# Patient Record
(demographics unavailable — no encounter records)

---

## 2024-11-20 NOTE — DISCUSSION/SUMMARY
[de-identified] : Assessment: Right/Left knee Osteoarthritis/Pain  Plan: 1. RICE Therapy. 2. Antiinflammatories/Tylenol as needed for pain of discomfort.  3. The patient was advised to rest the knee for 24-48 hours post injection.  The patient is able to resume normal activities in 24-48 hours post injection if the patient is experiencing minimal to no pain.  4. Continue with a home exercise/stretching program.  5. All the patients questions were answered.  If the patient is experiencing any problems or has concerns they were advised to call the office or make an appointment to come in to be evaluated.  Follow-up as needed.

## 2024-11-20 NOTE — PHYSICAL EXAM
[de-identified] : Right/left knee Physical Examination:  General: Alert and oriented x3.  In no acute distress.  Pleasant in nature with a normal affect.  No apparent respiratory distress.   Erythema, Warmth, Rubor: Negative Swelling/Edema: Negative ROM: 0-120 degrees, pain with hyperflexion.  Ayana's Test: Positive  Medial Joint Line TTP: Positive Lateral Joint Line TTP: Negative Lachman Exam/Anterior Drawer/Pivot Shift Test: Negative  MCL Pain: Negative LCL Pain: Negative Iliotibial Band Pain: Negative Patellofemoral Joint Pain: Negative Patellar Tendon TTP: Negative Pes Anserinus TTP: Negative Homans Sign: Negative Posterior Knee Pain: Negative Neuro: Intact with no sensory or motor deficits [de-identified] : No imaging performed.

## 2024-11-20 NOTE — PROCEDURE
[de-identified] : Laterality: Right knee  The risks and benefits of the injection were reviewed with the patient today in office and all their questions were answered.  The injection site was the anterolateral aspect of the knee with the patient sitting up, knees flexed to 90 degrees.  Prior to giving the injection the injection site was prepped with Chloraprep and a sterile field was created.  Sterile technique was carried out throughout the procedure.  After verbal consent from the patient we went ahead and injected the right knee today with Monovisc using a 22 willie 1.5" needle.  The medication was placed into the knee without complication.  Post injection the patient reported no pain, had a normal gait and good motion of the knee.  The patient denied numbness and tingling going down their leg.  There were no complications during the procedure.  Lot #[id number] Exp: 2026-12-02  Laterality: Left knee  The risks and benefits of the injection were reviewed with the patient today in office and all their questions were answered.  The injection site was the anterolateral aspect of the knee with the patient sitting up, knees flexed to 90 degrees.  Prior to giving the injection the injection site was prepped with Chloraprep and a sterile field was created.  Sterile technique was carried out throughout the procedure.  After verbal consent from the patient we went ahead and injected the left knee today with a steroid which consisted of 2ml of 40 mg Depo-Medrol using a 22 willie 1.5" needle.  After placing the steroid in the knee, it was chased with the Hyaluronic Acid medication (Monovisc) using the same 22 willie 1.5" needle.  The medication was placed into the knee without complication.  Post injection the patient reported no pain, had a normal gait and good motion of the knee.  The patient denied numbness and tingling going down their leg.  There were no complications during the procedure.  Lot #: [id number] Exp #: 2026-12-02

## 2024-11-20 NOTE — HISTORY OF PRESENT ILLNESS
[de-identified] : The patient is a 67-year-old active male with bilateral knee osteoarthritis seeking hyaluronic acid injections for bilateral knees.  He is also seeking a cortisone injection for his left knee for pain relief.  The patient remains active, continues to play ice hockey weekly.  No locking or catching of his knees.  The injections have helped in the past.  No other complaints.

## 2024-12-12 NOTE — PROCEDURE
[de-identified] : Laterality: Right knee  The risks and benefits of the injection were reviewed with the patient today in office and all their questions were answered.  The injection site was the anterolateral aspect of the knee with the patient sitting up, knees flexed to 90 degrees.  Prior to giving the injection the injection site was prepped with Chloraprep and a sterile field was created.  Sterile technique was carried out throughout the procedure.  After verbal consent from the patient we went ahead and injected the right knee today with 2 ml 40 mg Depo-Medrol, 2 ml 1% lidocaine without epinephrine for a total of 4 ml with a 25 willie 1.5" needle.  The medication was placed into the knee without complication.  Post injection the patient reported no pain, had a normal gait and good motion of the knee.  The patient denied numbness and tingling going down their leg.  There were no complications during the procedure.

## 2024-12-12 NOTE — PHYSICAL EXAM
[de-identified] : Right/left knee Physical Examination:  General: Alert and oriented x3.  In no acute distress.  Pleasant in nature with a normal affect.  No apparent respiratory distress.   Erythema, Warmth, Rubor: Negative Swelling/Edema: Negative ROM: 0-120 degrees, pain with hyperflexion.  Ayana's Test: Positive  Medial Joint Line TTP: Positive Lateral Joint Line TTP: Negative Lachman Exam/Anterior Drawer/Pivot Shift Test: Negative  MCL Pain: Negative LCL Pain: Negative Iliotibial Band Pain: Negative Patellofemoral Joint Pain: Negative Patellar Tendon TTP: Negative Pes Anserinus TTP: Negative Homans Sign: Negative Posterior Knee Pain: Negative Neuro: Intact with no sensory or motor deficits [de-identified] : No imaging performed.

## 2024-12-12 NOTE — HISTORY OF PRESENT ILLNESS
[de-identified] : The patient is a 67-year-old active male who presents in office seeking a right knee cortisone injection.  Patient was playing hockey tonight and felt a twinge in the knee.  Patient is very active and was also kneeling which increased his knee pain doing housework.  He denies locking, catching, giving out of his right knee.  No numbness or tingling going down his leg.  Pain scale 2 out of 10 in office today.  Pain increases with physical activity.  No other complaints.

## 2024-12-12 NOTE — DISCUSSION/SUMMARY
[de-identified] : Assessment: Right knee Osteoarthritis/Pain  Plan: 1. RICE Therapy. 2. Antiinflammatories/Tylenol as needed for pain of discomfort.  3. The patient was advised to rest the knee for 24-48 hours post injection.  The patient is able to resume normal activities in 24-48 hours post injection if the patient is experiencing minimal to no pain.  4. Continue with a home exercise/stretching program.  5. All the patients questions were answered.  If the patient is experiencing any problems or has concerns they were advised to call the office or make an appointment to come in to be evaluated.

## 2024-12-31 NOTE — ASSESSMENT
[FreeTextEntry1] : The patient is a 66 yo man presenting with left hip pain after increased activity and bowling. His pain is posterolateral to the left hip. He denies groin pain and lateral hip pain. He had bilateral hip resurfacing procedures with Dr. Dusty foley while back. The patient denies new trauma or falls. He denies paresthesias. The patient has increased pain with ambulation and stairs. He has used salon pas with good relief. His pain is deep to the glute. He has pain at night and with standing for too long.   Bilateral hip exam: The patient presents with no apparent distress. Neurovascularly intact. Sensation intact to bilateral lower extremities. Operative incision are well healed. No active drainage or discharge. + straight leg raise. 5/5 abductor strength. Tender to palpation to posterolateral hip through his glute and sciatic notch. No lateral hip pain at the greater trochanter. Patient is ambulating without pain.  Bilateral hip xrays taken today in office, 1 view AP pelvis and two views hip, WB: Bilateral hip resurfacing hardware intact without movement or loosening. No fractures noted. No obvious tumors, masses, or lesions  The patient was prescribed prednisone for pain relief. He likely has a sciatica flare up. He will return as needed.

## 2024-12-31 NOTE — HISTORY OF PRESENT ILLNESS
[FreeTextEntry5] : 66 y/o M presents for JR burgos today. Pt reports of difficulty sleeping on his Lt. side for the past 2 weeks. Elevated pain after bowling recently. Denies any direct trauma.  [FreeTextEntry9] : Salonpas [de-identified] : crossing leg over

## 2025-01-14 NOTE — DISCUSSION/SUMMARY
[de-identified] : 30 minutes was spent reviewing the x-rays as well as discussing with the patient their clinical presentation, diagnosis and providing education.  Conservative treatment was discussed with the patient at length. Anticipatory guidance regarding disease process right piriformis syndrome and gluteus tendinopathy bilateral scapulothoracic syndrome, avoidance of acute exacerbation this was discussed at length and all patients commenting concerns were answered to the patient's satisfaction. Physical therapy for decrease pain and increase function was ordered. Patient was given home exercises as approved by [employer] and works well held directed toward this particular process. Intermittent use of acetaminophen 500 mg 2 tablets t.i.d. p.r.n. mild to moderate pain, Medrol Dosepak for anti-inflammatory properties to be followed by meloxicam 15 mg once daily as needed for pain inflammation that is not controlled by Tylenol. Home exercise including stretching on a daily basis for 20-30 minutes was recommended. Heat, ice, topical were discussed as needed. The patient will followup in 6-8 weeks at which point in time if symptoms continue we will order lumbar and right hip MRI studies to guide treatment plan including possible injection therapy with pain management versus surgical option.

## 2025-01-14 NOTE — HISTORY OF PRESENT ILLNESS
[de-identified] : 67-year-old man who is a  by day and plays hockey and does bowling by night.  He is here for this focal right buttock pain which intermittently radiates down his lateral hip.  He also has some right greater than right scapulothoracic pain and tenderness worse with overhead lifting.  He feels that he is guarding when he skates regarding his right buttock and thigh.  Other than that he has no decrease in strength of the bilateral lower extremities no change in bowel bladder no radiating pain tingling and numbness down the legs.  No real back pain.  He has had piriformis syndrome in the past.  He has no recent physical therapy chiropractic pain management.  Pain onset after bowling twice and after playing hockey last night.  Pain has been intermittent for the last several weeks.  Past medical surgical social family allergy history is reviewed.

## 2025-01-14 NOTE — PHYSICAL EXAM
[de-identified] : CONSTITUTIONAL: The patient is a very pleasant individual who is well-nourished and who appears stated age. PSYCHIATRIC: The patient is alert and oriented X 3 and in no apparent distress, and participates with orthopedic evaluation well. HEAD: Atraumatic and is nonsyndromic in appearance. EENT: No visible thyromegaly, EOMI. RESPIRATORY: Respiratory rate is regular, not dyspneic on examination. LYMPHATICS: There is no inguinal lymphadenopathy INTEGUMENTARY: Skin is clean, dry, and intact about the bilateral lower extremities and lumbar spine. VASCULAR: There is brisk capillary refill about the bilateral lower extremities. NEUROLOGIC: There are no pathologic reflexes. There is no decrease in sensation of the bilateral lower extremities on manual examination. Deep tendon reflexes are well maintained at 2+/4 of the bilateral lower extremities and are symmetric.. MUSCULOSKELETAL: There is no visible muscular atrophy. Manual motor strength is well maintained in the bilateral lower extremities. Range of motion of lumbar spine is well maintained. The patient ambulates in a non-myelopathic manner. Negative tension sign and straight leg raise bilaterally. Quad extension, ankle dorsiflexion, EHL, plantar flexion, and ankle eversion are well preserved. Normal secondary orthopaedic exam of bilateral hips, greater trochanteric area, knees and ankles exam as highlighted by left gluteus tendinopathy piriformis syndrome type findings.  Bilateral tenderness to scapulothoracic borders right greater than left [de-identified] : AP lateral x-ray of lumbar spine done today in the office on the date of January 14, 2025 demonstrates age-appropriate spondylosis in the lower aspects of the lumbar spine without any acute process noted.  Bilateral total hip arthroplasty noted on x-ray as well

## 2025-03-04 NOTE — PHYSICAL EXAM
[Antalgic] : antalgic [de-identified] : CONSTITUTIONAL: The patient is a very pleasant individual who is well-nourished and who appears stated age. PSYCHIATRIC: The patient is alert and oriented X 3 and in no apparent distress, and participates with orthopedic evaluation well. HEAD: Atraumatic and is nonsyndromic in appearance. EENT: No visible thyromegaly, EOMI. RESPIRATORY: Respiratory rate is regular, not dyspneic on examination. LYMPHATICS: There is no inguinal lymphadenopathy INTEGUMENTARY: Skin is clean, dry, and intact about the bilateral lower extremities and lumbar spine. VASCULAR: There is brisk capillary refill about the bilateral lower extremities. NEUROLOGIC: There are no pathologic reflexes. There is no decrease in sensation of the bilateral lower extremities on manual  examination. Deep tendon reflexes are well maintained at 2+/4 of the bilateral lower extremities and are symmetric.. MUSCULOSKELETAL: There is no visible muscular atrophy. Manual motor strength is well maintained in the bilateral lower extremities, with the exception of 4/5 left hip flexor weakness. Range of motion of lumbar spine is well maintained, though there is pain with lateral bending. The patient ambulates in a non-myelopathic manner. Negative tension sign and straight leg raise bilaterally. Quad extension, ankle dorsiflexion, EHL, plantar flexion, and ankle eversion are well preserved. Normal secondary orthopaedic exam of bilateral hips, greater trochanteric area, knees and ankles. +TTP over the left lumbar paraspinal musculature and left gluteal region.  [de-identified] : X-rays of the lumbar spine visualized on today's date of February 28 shows diffuse lumbar spondylosis very gentle convexity to the right.  These are combined compared to x-rays from January 2025 in May 2022 with essentially no change more of a diffuse lumbar spondylosis type presentation.

## 2025-03-04 NOTE — PROCEDURE
[de-identified] : Verbal consent was obtained and all questions, comments and concerns were addressed.  After left superior gluteal/left lower paraspinal trigger point in the affected area into superficial muscular trigger point was cleansed with alcohol prep X 3, ethyl chloride was used as local anesthetic.  Under sterile precautions 1cc of 1 percent lidocaine without epinephrine, plus 10 mg depomedrol, were instilled into the affected trigger points.  Patient tolerated procedure well. Dry sterile dressing was placed and patient described relief of pain 5 minutes after procedure was performed.

## 2025-03-04 NOTE — PHYSICAL EXAM
[Antalgic] : antalgic [de-identified] : CONSTITUTIONAL: The patient is a very pleasant individual who is well-nourished and who appears stated age. PSYCHIATRIC: The patient is alert and oriented X 3 and in no apparent distress, and participates with orthopedic evaluation well. HEAD: Atraumatic and is nonsyndromic in appearance. EENT: No visible thyromegaly, EOMI. RESPIRATORY: Respiratory rate is regular, not dyspneic on examination. LYMPHATICS: There is no inguinal lymphadenopathy INTEGUMENTARY: Skin is clean, dry, and intact about the bilateral lower extremities and lumbar spine. VASCULAR: There is brisk capillary refill about the bilateral lower extremities. NEUROLOGIC: There are no pathologic reflexes. There is no decrease in sensation of the bilateral lower extremities on manual  examination. Deep tendon reflexes are well maintained at 2+/4 of the bilateral lower extremities and are symmetric.. MUSCULOSKELETAL: There is no visible muscular atrophy. Manual motor strength is well maintained in the bilateral lower extremities, with the exception of 4/5 left hip flexor weakness. Range of motion of lumbar spine is well maintained, though there is pain with lateral bending. The patient ambulates in a non-myelopathic manner. Negative tension sign and straight leg raise bilaterally. Quad extension, ankle dorsiflexion, EHL, plantar flexion, and ankle eversion are well preserved. Normal secondary orthopaedic exam of bilateral hips, greater trochanteric area, knees and ankles. +TTP over the left lumbar paraspinal musculature and left gluteal region.  [de-identified] : X-rays of the lumbar spine visualized on today's date of February 28 shows diffuse lumbar spondylosis very gentle convexity to the right.  These are combined compared to x-rays from January 2025 in May 2022 with essentially no change more of a diffuse lumbar spondylosis type presentation.

## 2025-03-04 NOTE — HISTORY OF PRESENT ILLNESS
[de-identified] : 67-year-old male presents today for follow-up evaluation of lower back pain and new onset left hip pain.  The patient was previously being treated for lumbar spondylosis, myositis as well as right gluteal tendinopathy/piriformis syndrome via physical therapy.  He states that after 3 weeks of consistent PT and home exercising he felt fantastic, symptoms essentially resolved and he was back to all of his activities.  However 3 weeks ago he was playing ice hockey and took a tumble onto the left-hand side.  He states he landed on the ice forcefully and slid towards the outer wall, hitting into it.  Since then he has been having primarily left-sided lumbar paraspinal pain and left supragluteal pain.  His pain is worse with prolonged standing and walking.  He has difficulty in standing up straight.  He also has difficulty lifting his left hip to put his shoes on or his close.  He denies any numbness tingling weakness or radiating pain down bilateral lower extremities.  No bowel / bladder incontinence. No saddle anesthesia.  Topical modalities such as heat, ice, lidocaine patches have been helpful in managing symptoms.  Meloxicam has also been helpful.

## 2025-03-04 NOTE — DISCUSSION/SUMMARY
[Medication Risks Reviewed] : Medication risks reviewed [de-identified] : 67 year old male presents with symptoms suggestive of lumbar spondylosis, myositis, and left gluteal tendinopathy s/p falls while playing ice hockey. 35 minutes was spent reviewing the x-rays as well as discussing with the patient their clinical presentation, diagnosis and providing education. Conservative treatment was discussed with the patient at length. Anticipatory guidance regarding disease process, avoidance of acute exacerbation this was discussed at length and all patients commenting concerns were answered to the patient's satisfaction.  At this time MRI of the lumbar spine as well as the left hip are ordered medically necessary to evaluate for underlying discogenic pathology that may be amenable to consultation with pain management versus tendinopathy.  Patient underwent a trigger point injection in the office today and tolerated well.  Patient will continue with activities as tolerated/PT as tolerated.  Advised patient to hold off on ice hockey until we obtain advanced imaging until pain resolves.  He will follow-up after MRIs to review results discuss any need for additional treatment modalities.  In the interim also continue with anti-inflammatories as needed. All questions and concerns were addressed with the patient and they are in agreement with this plan.   This note was generated using dragon medical dictation software. A reasonable effort has been made for proofreading its contents, but typos may still remain. If there are any questions or points of clarification needed please notify my office.

## 2025-03-04 NOTE — PROCEDURE
[de-identified] : Verbal consent was obtained and all questions, comments and concerns were addressed.  After left superior gluteal/left lower paraspinal trigger point in the affected area into superficial muscular trigger point was cleansed with alcohol prep X 3, ethyl chloride was used as local anesthetic.  Under sterile precautions 1cc of 1 percent lidocaine without epinephrine, plus 10 mg depomedrol, were instilled into the affected trigger points.  Patient tolerated procedure well. Dry sterile dressing was placed and patient described relief of pain 5 minutes after procedure was performed.

## 2025-03-04 NOTE — DISCUSSION/SUMMARY
[Medication Risks Reviewed] : Medication risks reviewed [de-identified] : 67 year old male presents with symptoms suggestive of lumbar spondylosis, myositis, and left gluteal tendinopathy s/p falls while playing ice hockey. 35 minutes was spent reviewing the x-rays as well as discussing with the patient their clinical presentation, diagnosis and providing education. Conservative treatment was discussed with the patient at length. Anticipatory guidance regarding disease process, avoidance of acute exacerbation this was discussed at length and all patients commenting concerns were answered to the patient's satisfaction.  At this time MRI of the lumbar spine as well as the left hip are ordered medically necessary to evaluate for underlying discogenic pathology that may be amenable to consultation with pain management versus tendinopathy.  Patient underwent a trigger point injection in the office today and tolerated well.  Patient will continue with activities as tolerated/PT as tolerated.  Advised patient to hold off on ice hockey until we obtain advanced imaging until pain resolves.  He will follow-up after MRIs to review results discuss any need for additional treatment modalities.  In the interim also continue with anti-inflammatories as needed. All questions and concerns were addressed with the patient and they are in agreement with this plan.   This note was generated using dragon medical dictation software. A reasonable effort has been made for proofreading its contents, but typos may still remain. If there are any questions or points of clarification needed please notify my office.

## 2025-03-04 NOTE — HISTORY OF PRESENT ILLNESS
[de-identified] : 67-year-old male presents today for follow-up evaluation of lower back pain and new onset left hip pain.  The patient was previously being treated for lumbar spondylosis, myositis as well as right gluteal tendinopathy/piriformis syndrome via physical therapy.  He states that after 3 weeks of consistent PT and home exercising he felt fantastic, symptoms essentially resolved and he was back to all of his activities.  However 3 weeks ago he was playing ice hockey and took a tumble onto the left-hand side.  He states he landed on the ice forcefully and slid towards the outer wall, hitting into it.  Since then he has been having primarily left-sided lumbar paraspinal pain and left supragluteal pain.  His pain is worse with prolonged standing and walking.  He has difficulty in standing up straight.  He also has difficulty lifting his left hip to put his shoes on or his close.  He denies any numbness tingling weakness or radiating pain down bilateral lower extremities.  No bowel / bladder incontinence. No saddle anesthesia.  Topical modalities such as heat, ice, lidocaine patches have been helpful in managing symptoms.  Meloxicam has also been helpful.

## 2025-03-07 NOTE — DISCUSSION/SUMMARY
[Medication Risks Reviewed] : Medication risks reviewed [de-identified] : 67 year old male presents with symptoms suggestive of lumbar spondylosis, left gluteal tendinopathy with an acute flareup status post falls. 35 minutes was spent reviewing the x-rays/MRI as well as discussing with the patient their clinical presentation, diagnosis and providing education. Conservative treatment was discussed with the patient at length. Anticipatory guidance regarding disease process, avoidance of acute exacerbation this was discussed at length and all patients commenting concerns were answered to the patient's satisfaction.  Patient states that he is already starting to improve after a period of rest from ice hockey as well as trigger point injection.  At this time recommending to continue with conservative modalities, light home stretching like lower body/gluteal strengthening exercises.  Advised to follow another 1 to 2 weeks prior to returning to ice hockey and focusing significantly on stretching and strengthening exercises as well as core exercises.  He can continue with medication as needed, topical modalities as needed.  When he feels comfortable he can return to activities as tolerated.  He can follow-up with us on an as-needed basis.  If symptoms are persistent we can also consider a repeat trigger point injection. All questions and concerns were addressed with the patient and they are in agreement with this plan.   This note was generated using dragon medical dictation software. A reasonable effort has been made for proofreading its contents, but typos may still remain. If there are any questions or points of clarification needed please notify my office.

## 2025-03-07 NOTE — PHYSICAL EXAM
[Antalgic] : antalgic [de-identified] : CONSTITUTIONAL: The patient is a very pleasant individual who is well-nourished and who appears stated age. PSYCHIATRIC: The patient is alert and oriented X 3 and in no apparent distress, and participates with orthopedic evaluation well. HEAD: Atraumatic and is nonsyndromic in appearance. EENT: No visible thyromegaly, EOMI. RESPIRATORY: Respiratory rate is regular, not dyspneic on examination. LYMPHATICS: There is no inguinal lymphadenopathy INTEGUMENTARY: Skin is clean, dry, and intact about the bilateral lower extremities and lumbar spine. VASCULAR: There is brisk capillary refill about the bilateral lower extremities. NEUROLOGIC: There are no pathologic reflexes. There is no decrease in sensation of the bilateral lower extremities on manual  examination. Deep tendon reflexes are well maintained at 2+/4 of the bilateral lower extremities and are symmetric.. MUSCULOSKELETAL: There is no visible muscular atrophy. Manual motor strength is well maintained in the bilateral lower extremities, with the exception of 4/5 left hip flexor weakness. Range of motion of lumbar spine is well maintained, though there is pain with lateral bending. The patient ambulates in a non-myelopathic manner. Negative tension sign and straight leg raise bilaterally. Quad extension, ankle dorsiflexion, EHL, plantar flexion, and ankle eversion are well preserved. Normal secondary orthopaedic exam of bilateral hips, greater trochanteric area, knees and ankles. +TTP over the left lumbar paraspinal musculature and left gluteal region, improved from previous visit. [de-identified] : X-rays of the lumbar spine visualized on today's date of February 28 shows diffuse lumbar spondylosis very gentle convexity to the right.  These are combined compared to x-rays from January 2025 in May 2022 with essentially no change more of a diffuse lumbar spondylosis type presentation.  MRI of the lumbar spine as well as the left hip obtained on 3/1/2025 at [facility] and reviewed in the office today demonstrated multilevel lumbar spondylosis without any areas of significant/severe stenosis noted.  Findings are mildly progressed from previous examinations.  He is status post left hip arthroplasty without any evidence of hardware loosening/failure.  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  No acute changes noted in the lumbar or left hip.

## 2025-03-07 NOTE — HISTORY OF PRESENT ILLNESS
[de-identified] : 67-year-old male presents today for follow-up evaluation of lower back pain and new onset left hip pain.  The patient was previously being treated for lumbar spondylosis, myositis as well as right gluteal tendinopathy/piriformis syndrome via physical therapy.  He states that after 3 weeks of consistent PT and home exercising he felt fantastic, symptoms essentially resolved and he was back to all of his activities.  However 3 weeks ago he was playing ice hockey and took a tumble onto the left-hand side.  He states he landed on the ice forcefully and slid towards the outer wall, hitting into it.  Since then he has been having primarily left-sided lumbar paraspinal pain and left supragluteal pain.  His pain is worse with prolonged standing and walking.  He has difficulty in standing up straight.  He also has difficulty lifting his left hip to put his shoes on or his close.  He denies any numbness tingling weakness or radiating pain down bilateral lower extremities.  No bowel / bladder incontinence. No saddle anesthesia.  Topical modalities such as heat, ice, lidocaine patches have been helpful in managing symptoms.  Meloxicam has also been helpful.  3/7/2025: Interval history-patient presents today for follow-up evaluation regarding back/gluteal pain status post a fall while ice-skating.  He is also here for MRI review.  States that after previous trigger point injection performed in the office approximately 1 week ago he has had great improvement in symptoms.  States that he is now able to walk and stand for prolonged periods of time without any significant pain.  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  Not having any radiculopathy.  No numbness tingling weakness.

## 2025-04-30 NOTE — PHYSICAL EXAM
[de-identified] : Right knee Physical Examination:  General: Alert and oriented x3.  In no acute distress.  Pleasant in nature with a normal affect.  No apparent respiratory distress.   Erythema, Warmth, Rubor: Negative Swelling/Edema: Positive swelling ROM: 0-130 degrees Ayana's Test: Negative  Medial Joint Line TTP: + Lateral Joint Line TTP: Negative  Lachman Exam/Anterior Drawer/Pivot Shift Test: Negative  MCL Pain: Negative LCL Pain: Negative Iliotibial Band Pain: Negative Patellofemoral Joint Pain: Negative Patellar Tendon TTP: Negative Pes Anserinus TTP: Negative Homans Sign: Negative Posterior Knee Pain: Negative Neuro: Intact with no sensory or motor deficits [de-identified] : Previous x-rays reviewed showed mild arthritic changes right knee.

## 2025-04-30 NOTE — HISTORY OF PRESENT ILLNESS
[de-identified] : The patient is a 67-year-old male who presents with acute on chronic right knee pain.  He was playing hockey in February and took a fall.  He did injure his back during the fall and states that since then, he does have pain in the right knee.  Most of his pain is behind the knee.  He states that he has increased pain with weightbearing and playing sports.  He is concerned with the increased pain to the right knee.  Pain scale at worst 5 out of 10.  No other complaints.

## 2025-04-30 NOTE — DISCUSSION/SUMMARY
[de-identified] : I do want to proceed with an MRI of the right knee without contrast to evaluate the posterior knee, evaluate joint OA/meniscal tearing.  Activity modifications until the MRI is completed.  Over-the-counter knee brace for sleeve.  Over-the-counter Tylenol for pain.  RICE therapy for swelling control.  Once the MRI is completed, the patient will return to office to review.  All of his questions were answered.  Understood and agreed to the treatment course.

## 2025-05-08 NOTE — DISCUSSION/SUMMARY
[de-identified] : Very pleasant gentleman tolerated a left thoracolumbar trigger point injection very well patient is kaylan be engaged in repeat physical therapy core strengthening soft tissue modalities foundation training home exercise program.  Continue being active ice-skating ice hockey is reasonable at this point in time signs and symptoms are still persistent patient can follow-up in approximately 6 to 8 weeks we will consider repeat trigger point injection.

## 2025-05-08 NOTE — PHYSICAL EXAM
[Antalgic] : antalgic [de-identified] : CONSTITUTIONAL: The patient is a very pleasant individual who is well-nourished and who appears stated age. PSYCHIATRIC: The patient is alert and oriented X 3 and in no apparent distress, and participates with orthopedic evaluation well. HEAD: Atraumatic and is nonsyndromic in appearance. EENT: No visible thyromegaly, EOMI. RESPIRATORY: Respiratory rate is regular, not dyspneic on examination. LYMPHATICS: There is no inguinal lymphadenopathy INTEGUMENTARY: Skin is clean, dry, and intact about the bilateral lower extremities and lumbar spine. VASCULAR: There is brisk capillary refill about the bilateral lower extremities. NEUROLOGIC: There are no pathologic reflexes. There is no decrease in sensation of the bilateral lower extremities on manual  examination. Deep tendon reflexes are well maintained at 2+/4 of the bilateral lower extremities and are symmetric.. MUSCULOSKELETAL: There is no visible muscular atrophy. Manual motor strength is well maintained in the bilateral lower extremities. Range of motion of lumbar spine is well maintained antalgic secondary to left thoracolumbar pain. The patient ambulates in a non-myelopathic manner. Negative tension sign and straight leg raise bilaterally. Quad extension, ankle dorsiflexion, EHL, plantar flexion, and ankle eversion are well preserved. Normal secondary orthopaedic exam of bilateral hips, greater trochanteric area, knees and ankles, very reproducible and rather large trigger point and myositis finding about the left thoracolumbar region.  Left buttock myalgia secondary to gluteus tearing tendinopathy completely resolved [de-identified] : Previous lumbar x-rays have been reviewed showing age-appropriate lumbar spondylosis no compression fractures.  Previous lumbar MRI is also been reviewed showing age-appropriate spondylosis mild possibly moderate spinal stenosis.

## 2025-05-08 NOTE — HISTORY OF PRESENT ILLNESS
[de-identified] : Very pleasant 67-year-old gentleman presents to follow-up for history of mechanically orientate of low back pain.  Since his hockey fall he is actually improved quite a bit.  He has noticed with increasing activity in the spring his left thoracolumbar myositis type presentation is persistent.  No distal radiculopathy status post bilateral hip replacements and complaints of right greater than left knee pain.  Otherwise doing relatively well from a spine perspective continues to play hockey be active excetra [Stable] : stable [___ mths] : [unfilled] month(s) ago [2] : a current pain level of 2/10 [0] : a minimum pain level of 0/10 [10] : a maximum pain level of 10/10 [Intermit.] : ~He/She~ states the symptoms seem to be intermittent [Bending] : worsened by bending [Lifting] : worsened by lifting [Exercise Regimen] : relieved by exercise regimen [Heat] : relieved by heat [Rest] : relieved by rest [Ataxia] : no ataxia [Incontinence] : no incontinence [Loss of Dexterity] : good dexterity [Urinary Ret.] : no urinary retention

## 2025-05-08 NOTE — PROCEDURE
[de-identified] : Verbal consent was obtained and all questions, comments and concerns were addressed.  After left thoracolumbar paraspinal trigger point in the affected area into superficial muscular trigger point was cleansed with alcohol prep X 3, ethyl chloride was used as local anesthetic.  Under sterile precautions 1cc of 1 percent lidocaine without epinephrine, plus 10 mg depomedrol, were instilled into the affected trigger points.  Patient tolerated procedure well. Dry sterile dressing was placed and patient described relief of pain 5 minutes after procedure was performed.

## 2025-05-28 NOTE — HISTORY OF PRESENT ILLNESS
[de-identified] : The patient is a 67-year-old male who presents with bilateral knee pain from OA.  The patient is concerned with his continued bilateral knee pain from his osteoarthritis.  He states that he tries to be active such as playing ice hockey but he has a lot of trouble playing because of his bilateral knee pain.  He is here seeking pain relief with possible gel injections and cortisone injections.  No other complaints.

## 2025-05-28 NOTE — PROCEDURE
[de-identified] : Laterality: Bilateral knee  The risks and benefits of the injection were reviewed with the patient today in office and all their questions were answered.  The injection site was the anterolateral aspect of the knee with the patient sitting up, knees flexed to 90 degrees.  Prior to giving the injection the injection site was prepped with Chloraprep and a sterile field was created.  Sterile technique was carried out throughout the procedure.  After verbal consent from the patient we went ahead and injected the bilateral knee today with a steroid which consisted of 1ml of 40 mg Depo-Medrol using a 22 willie 1.5" needle.  After placing the steroid in the knee, it was chased with the Hyaluronic Acid medication (Gel One) using the same 22 willie 1.5" needle.  The medication was placed into the knee without complication.  Post injection the patient reported no pain, had a normal gait and good motion of the knee.  The patient denied numbness and tingling going down their leg.  There were no complications during the procedure.  Gel One Lot #: [id number] Exp #: 2026-09-30  depo-medrol Lot#[id number] Exp: 2026/11

## 2025-05-28 NOTE — DISCUSSION/SUMMARY
[de-identified] : Assessment: Bilateral knee Osteoarthritis/Pain  Plan: 1. RICE Therapy. 2. Antiinflammatories/Tylenol as needed for pain of discomfort.  3. The patient was advised to rest the knee for 24-48 hours post injection.  The patient is able to resume normal activities in 24-48 hours post injection if the patient is experiencing minimal to no pain.  4. Continue with a home exercise/stretching program.  5. All the patients questions were answered.  If the patient is experiencing any problems or has concerns they were advised to call the office or make an appointment to come in to be evaluated.  Referral given to meet with Dr. Andrew Puentes.

## 2025-05-28 NOTE — PHYSICAL EXAM
[de-identified] : Right/left knee Physical Examination:  General: Alert and oriented x3.  In no acute distress.  Pleasant in nature with a normal affect.  No apparent respiratory distress.   Erythema, Warmth, Rubor: Negative Swelling/Edema: No swelling present today bilateral knees. ROM: 0-130 degrees Ayana's Test: Negative  Medial Joint Line TTP: + Lateral Joint Line TTP: Negative  Lachman Exam/Anterior Drawer/Pivot Shift Test: Negative  MCL Pain: Negative LCL Pain: Negative Iliotibial Band Pain: Negative Patellofemoral Joint Pain: Negative Patellar Tendon TTP: Negative Pes Anserinus TTP: Negative Homans Sign: Negative Posterior Knee Pain: Negative Neuro: Intact with no sensory or motor deficits [de-identified] : No new imaging performed.

## 2025-07-01 NOTE — ASSESSMENT
[FreeTextEntry1] : Patient presents for follow-up evaluation.  He has mild obstructive sleep apnea. Patient is also morbidly obese. Unfortunately does not qualify for Zepbound therapy since his AHI is 10 events per hour. He will continue on current APAP therapy. Follow-up in 6 months.

## 2025-07-01 NOTE — HISTORY OF PRESENT ILLNESS
[TextBox_4] : Mr. Hutchins presents for follow-up evaluation.  He has a history of mild obstructive sleep apnea.  Previous polysomnography examination showed an AHI of 10.  Patient is currently on APAP 5-15 cm H2O.  He recently had sinus surgery so has not been wearing his CPAP for the past 3 to 4 weeks but has recently restarted.

## 2025-07-10 NOTE — PHYSICAL EXAM
[de-identified] : Right lower extremity Hip: Normal ROM without pain on IR/ER Knee Inspection: Mild effusion Wounds: None Alignment: Mild valgus Palpation: tender to palpation at medial joint line ROM active (in degrees): 0-130 with crepitus/pain through the arc of motion Ligamentous laxity: All ligaments appear stable, stable to varus stress test, stable to valgus stress test Meniscal Test: Negative McMurrays, negative Sindhu. Muscle Test: good quad strength. [de-identified] : 7/10/25: Right knee xrays, standing AP/Lateral and Merchant films, and 45 degree PA standing view are reviewed and demonstrate diffuse tricompartmental degenerative arthritis, lateral joint space narrowing, marginal osteophytes, bone on bone with sclerosis, patellofemoral joint space narrowing

## 2025-07-10 NOTE — HISTORY OF PRESENT ILLNESS
[de-identified] : 7/10/25: Patient presents with right knee pain for years.  Patient has received multiple injections in the past, last was Gel-One on 5/20/2025 by Dr. Soler which only provided a few weeks of relief.  His left knee is doing fine at this point.  Patient plays pickle ball and ice hockey and feels he is unable to play ice hockey due to the knee pain.  Using Voltaren gel.  Has done physical therapy which provided some relief.  Is considering a trip in November but debating on where exactly based on how his knee is going to be.  Also states he may start Zepbound for weight loss.  Relevant PMH: Sleep apnea, hypertension, hyperlipidemia Relevant allergies: Penicillin AC: 81 aspirin Hx of DVT/PE: Denies Nicotine: Denies

## 2025-07-10 NOTE — DISCUSSION/SUMMARY
[de-identified] : Severe right knee osteoarthritis, valgus  Extensive discussion of the natural history of this disease was had with the patient.  We discussed the treatment options ranging from conservative therapy which includes anti-inflammatories, steroid/HA injections, physical therapy, weight loss, knee sleeves/braces, and activity modification.  We did discuss that the ultimate treatment for arthritis is a joint replacement.   -A prescription for meloxicam with the appropriate warnings for GI, cardiac, and renal side effects was given to the patient.  Patient was instructed not to use any other NSAIDs with this medication. He is unsure if he wants to move forward with surgery or try steroid injection.  Gel injections did not provide prolonged relief. Patient will follow-up as needed.  This patient is being managed for a complex chronic issue that requires ongoing medical management. The nature of this condition requires a longitudinal relationship and monitoring over time for appropriate treatment. The patient's current medication management of their orthopedic diagnosis was reviewed today: (1) We discussed a comprehensive treatment plan that included possible pharmaceutical management involving the use of prescription strength medications including but not limited to options such as oral Ibuprofen 400mg QID, once daily Meloxicam 15 mg, or 500-650 mg Tylenol versus over the counter oral medications and topical prescription NSAID Pennsaid vs over the counter Voltaren gel. (2) There is a moderate risk of morbidity with further treatment, especially from use of prescription strength medications and possible side effects of these medications which include upset stomach with oral medications, skin reactions to topical medications and cardiac/renal issues with long term use. (3) I recommended that the patient follow-up with their medical physician to discuss any significant specific potential issues with long term medication use such as interactions with current medications or with exacerbation of underlying medical comorbidities. (4) The benefits and risks associated with use of injectable, oral or topical, prescription and over the counter anti-inflammatory medications were discussed with the patient. The patient voiced understanding of the risks including but not limited to bleeding, stroke, kidney dysfunction, heart disease, and were referred to the black box warning label for further information.

## 2025-07-17 NOTE — HISTORY OF PRESENT ILLNESS
[de-identified] : The patient is a 68 year  old RT hand dominant male who presents today complaining of RT Knee pain.   Date of Injury/Onset: 2017 Pain:    At Rest: 0/10  With Activity:  7/10  Mechanism of injury: while playing ice hockey injured his right knee This is NOT a Work Related Injury being treated under Worker's Compensation. This is NOT an athletic injury occurring associated with an interscholastic or organized sports team. Quality of symptoms: sharp pain  Improves with: rest Worse with: lateral movements Prior treatment: RT Knee meniscectomy in 2017 & 2019 - Dr. Dowd, Dr. Soler, Gel injections with TRACI Mcbride (5/28/25) - TRACI Mcbride referred him to Dr. Puentes for a TKA Prior Imaging: MRI 5/2025 at  imaging Out of work/sport: currently working  School/Sport/Position/Occupation: CPA : pickleball, ice hockey Additional Information: Had Left Shoulder Surgery with Dr Camp in 05/2023

## 2025-07-17 NOTE — IMAGING
[de-identified] : The patient is a well appearing 68 year old male of their stated age. Patient ambulates with a normal gait. Negative straight leg raise bilateral.   Effected Knee: RIGHT ROM:  0-120 degrees Lachman: Negative Pivot Shift: Negative Anterior Drawer: Negative Posterior Drawer / Sag: Negative Varus Stress 0 degrees: Stable Varus Stress 30 degrees: Stable Valgus Stress 0 degrees: Stable Valgus Stress 30 degrees: Stable Medial Ayana: Negative Lateral Ayana: Negative Patella Glide: 2+ Patella Apprehension: Negative Patella Grind: Negative Pes Concord Valgus: Negative Pes Cavus: Negative Triple Hop: Negative Squat Jump: Negative   Palpation: +VARUS AND VALGUS JOG Medial Joint Line: Nontender  Lateral Joint Line: TENDER Medial Collateral Ligament: Nontender Lateral Collateral Ligament/PLC: Nontender Distal Femur: Nontender Proximal Tibia: Nontender Tibial Tubercle: Nontender Gerdy's Tubercle: Nontender Distal Pole Patella: Nontender Quadriceps Tendon: Nontender & Intact Patella Tendon: Nontender & Intact Medial Femoral Condyle: Nontender Medial Distal Hamstring/PES: Nontender Lateral Distal Hamstring: Nontender & Stable Iliotibial Band: Nontender Medial Patellofemoral Ligament: Nontender Adductor: Nontender Proximal GSC-Plantaris: Nontender Calf: Supple & Nontender   Inspection: Deformity: No Erythema: No Ecchymosis: No Abrasions: No Effusion: MILD Prepatellar Bursitis: No Neurologic Exam: Sensation L4-S1: Grossly Intact   Motor Exam: Quadriceps: 5 out of 5 Hamstrings: 5 out of 5 EHL: 5 out of 5 FHL: 5 out of 5 TA: 5 out of 5 GS: 5 out of 5 Circulatory/Pulses: Dorsalis Pedis: 2+ Posterior Tibialis: 2+ Additional Pertinent Findings: None   Contralateral Knee:                        ROM: 0-145 degrees Other Pertinent Findings: None   Assessment: The patient is a 68 year old male with right knee pain and radiographic and physical exam findings consistent with Right knee osteoarthritis. The patient's condition is acute. Documents/Results Reviewed Today: MRI right knee Tests/Studies Independently Interpreted Today: MRI right knee revealed evidence of Advanced tibial and femoral condyle osteoarthritis in the lateral compartment Pertinent findings include: 0-120 degrees, mild effusion, +LJLT, +varus and valgus jog Confounding medical conditions/concerns: None  Plan: The patient came in requesting evaluaton for cartiheal surgery due to the advanced osteoarthrits in the lateral compartment in both the tibial and femoral condyle. We discussed that he is not a candidate. The patient will continue conservative care and consider joint replacement in the future. He will continue the use of the reparel sleeve.  Tests Ordered: None Prescription Medications Ordered: Discussed appropriate use of OTC anti-inflammatories and analgesics (including but not limited to Aleve, Advil, Tylenol, Motrin, Ibuprofen, Voltaren gel, etc.) Braces/DME Ordered: Reparel sleeve Activity/Work/Sports Status: As tolerated Additional Instructions: None Follow-Up: as needed  The patient's current medication management of their orthopedic diagnosis was reviewed today: The patient declined and/or was contraindicated for the recommended prescription medication Naprosyn and will use over the counter Advil, Aleve, Voltaren Gel or Tylenol as directed. (1) We discussed a comprehensive treatment plan that included possible pharmaceutical management involving the use of prescription strength medications including but not limited to options such as oral Naprosyn 500mg BID, once daily Meloxicam 15 mg, or 500-650 mg Tylenol versus over the counter oral medications and topical prescription NSAID Pennsaid vs over the counter Voltaren gel.  Based on our extensive discussion, the patient declined prescription medication and will use over the counter Advil, Alleve, Voltaren Gel or Tylenol as directed. (2) There is a moderate risk of morbidity with further treatment, especially from use of prescription strength medications and possible side effects of these medications which include upset stomach with oral medications, skin reactions to topical medications and cardiac/renal issues with long term use. (3) I recommended that the patient follow-up with their medical physician to discuss any significant specific potential issues with long term medication use such as interactions with current medications or with exacerbation of underlying medical comorbidities. (4) The benefits and risks associated with use of injectable, oral or topical, prescription and over the counter anti-inflammatory medications were discussed with the patient. The patient voiced understanding of the risks including but not limited to bleeding, stroke, kidney dysfunction, heart disease, and were referred to the black box warning label for further information.  I, Jaylen Lofton, attest that this documentation has been prepared under the direction and in the presence of Provider Dr. Luke Camp.  The documentation recorded by the scribe accurately reflects the services I, Dr. Luke Camp, personally performed and the decisions made by me.

## 2025-07-17 NOTE — DATA REVIEWED
[MRI] : MRI [Right] : of the right [Knee] : knee [Report was reviewed and noted in the chart] : The report was reviewed and noted in the chart [I independently reviewed and interpreted images and report] : I independently reviewed and interpreted images and report [I reviewed the films/CD and additionally noted] : I reviewed the films/CD and additionally noted [FreeTextEntry1] : MRI right knee revealed evidence of Advanced tibial and femoral condyle osteoarthritis in the lateral compartment